# Patient Record
(demographics unavailable — no encounter records)

---

## 2025-05-07 NOTE — CONSULT LETTER
[Dear  ___] : Dear ~JAK, [Courtesy Letter:] : I had the pleasure of seeing your patient, [unfilled], in my office today. [Please see my note below.] : Please see my note below. [Consult Closing:] : Thank you very much for allowing me to participate in the care of this patient.  If you have any questions, please do not hesitate to contact me. [Sincerely,] : Sincerely, [FreeTextEntry2] : Dr. Peter Bennett [FreeTextEntry3] : Ata Browne M.D., F.CHIDI.C.S., F.A.S.C.R.S. Chief Colorectal Clinical Services, [facility]

## 2025-05-07 NOTE — HISTORY OF PRESENT ILLNESS
[FreeTextEntry1] : Daniella is a 73 y/o female being seen for consultation, hemorrhoids  Colonoscopy by Dr. Bennett - 5/24/21 - Internal hemorrhoids - due for another one due difficulty swallowing had two neck surgeries   Today patient reports feeling intermittent anorectal discomfort and pain and swelling tissues for few months worse in the past 6 weeks, tissues are always out uses prep H and does sitz bath with little relief, sees mucus in stool has had this happened in the past.  Formed/soft/hard BMs every other day, no straining, no bleeding, has h/o chronic constipation, takes Metamucil daily.  No episodes of incontinence of stool or flatus.  Good appetite.  No c/o nausea or vomiting.  Denies fever and chills.  No family history of colorectal cancer.  Patient is on Eliquis for h/o PE and DVT.

## 2025-05-07 NOTE — ASSESSMENT
[FreeTextEntry1] : I have seen and evaluated the patient, and I have corroborated all nursing input into this note.  The patient has perianal and rectal burning.  This may be hemorrhoidal in origin.  However, she eats a significant amount of chocolate daily and I informed her this can be irritating to the perianal skin and anoderm.  I encouraged the patient to continue her daily Metamucil and reviewed perianal skin care.  I prescribed short course of hydrocortisone suppositories for the mild hemorrhoid enlargement.  If the patient is still symptomatic after 4 to 8 weeks she will return to my office.

## 2025-05-07 NOTE — PHYSICAL EXAM
[Normal Breath Sounds] : Normal breath sounds [Normal Heart Sounds] : normal heart sounds [Alert] : alert [Oriented to Person] : oriented to person [Oriented to Place] : oriented to place [Oriented to Time] : oriented to time [Calm] : calm [de-identified] : WNL [de-identified] : WNL [de-identified] : AMAIRANIL [de-identified] : WNL [de-identified] : WNL [FreeTextEntry1] : Perianal inspection unremarkable.  Digital exam and anoscopy with mild to moderate internal hemorrhoid enlargement.  Anoscopy performed to evaluate internal hemorrhoids.  No sedation required.

## 2025-05-07 NOTE — PHYSICAL EXAM
[Normal Breath Sounds] : Normal breath sounds [Normal Heart Sounds] : normal heart sounds [Alert] : alert [Oriented to Person] : oriented to person [Oriented to Place] : oriented to place [Oriented to Time] : oriented to time [Calm] : calm [de-identified] : WNL [de-identified] : WNL [de-identified] : AMAIRANIL [de-identified] : WNL [de-identified] : WNL [FreeTextEntry1] : Perianal inspection unremarkable.  Digital exam and anoscopy with mild to moderate internal hemorrhoid enlargement.  Anoscopy performed to evaluate internal hemorrhoids.  No sedation required.

## 2025-05-07 NOTE — CONSULT LETTER
[Dear  ___] : Dear ~JAK, [Courtesy Letter:] : I had the pleasure of seeing your patient, [unfilled], in my office today. [Please see my note below.] : Please see my note below. [Consult Closing:] : Thank you very much for allowing me to participate in the care of this patient.  If you have any questions, please do not hesitate to contact me. [Sincerely,] : Sincerely, [FreeTextEntry2] : Dr. Peter Bennett [FreeTextEntry3] : Ata Browne M.D., F.CHIDI.C.S., F.A.S.C.R.S. Chief Colorectal Clinical Services, [facility]